# Patient Record
(demographics unavailable — no encounter records)

---

## 2025-01-12 NOTE — END OF VISIT
[FreeTextEntry3] : This note was written by Karen Trevino on 01/09/2025 actively solely Shana Man M.D.  All medical record entries made by this scribe at my, Shana Man M.D direction and personally dictated by me on 01/09/2025. I have personally reviewed the chart and agree that the record reflects my personal performance of the history, physical exam, assessment, and plan.

## 2025-01-12 NOTE — PLAN
[FreeTextEntry1] : - HPV/ PAP done today. - Discussed mammo results, referral given.  - RTO for 2 year annual/ PRN.

## 2025-01-12 NOTE — PHYSICAL EXAM
[Chaperone Present] : A chaperone was present in the examining room during all aspects of the physical examination [Appropriately responsive] : appropriately responsive [Alert] : alert [No Acute Distress] : no acute distress [No Lymphadenopathy] : no lymphadenopathy [Soft] : soft [Non-tender] : non-tender [Non-distended] : non-distended [No HSM] : No HSM [No Lesions] : no lesions [No Mass] : no mass [Oriented x3] : oriented x3 [Examination Of The Breasts] : a normal appearance [No Discharge] : no discharge [No Masses] : no breast masses were palpable [Labia Majora] : normal [Labia Minora] : normal [Normal] : normal [Absent] : absent [Uterine Adnexae] : absent [40213] : A chaperone was present during the pelvic exam. [FreeTextEntry2] : HEMA Mcneil [FreeTextEntry6] : Pt has a hx of super cervical hysterectomy.

## 2025-01-12 NOTE — HISTORY OF PRESENT ILLNESS
[Y] : Patient is sexually active [FreeTextEntry1] : 74 year old female presents for annual GYN exam. Pt reports her BP is always high when she goes to the doctors office. Pt reports she has been well. Pt reports in 2021 her mammo showed micro calcifications and was told it was benign, and inquired about previous mammo.  Pt inquired about MRI and sono of breast. Pt reports she has vaginal estrogen and does not want any more interventions.   Of note, pt has a hx of super cervical hysterectomy. Pt reports she retired 2 years ago. [Monogamous (Male Partner)] : is monogamous with a male partner [Mammogramdate] : 08/2024 [TextBox_19] : f/u with a repeat mammo in 6 months. [PapSmeardate] : 09/2020 [TextBox_37] : showed osteopenia [BoneDensityDate] : 01/2023 [ColonoscopyDate] : 11/2020 [PGHxTotal] : 3 [[facility] : [facility] [[facility]] : 3

## 2025-01-12 NOTE — HISTORY OF PRESENT ILLNESS
[Y] : Patient is sexually active [FreeTextEntry1] : 74 year old female presents for annual GYN exam. Pt reports her BP is always high when she goes to the doctors office. Pt reports she has been well. Pt reports in 2021 her mammo showed micro calcifications and was told it was benign, and inquired about previous mammo.  Pt inquired about MRI and sono of breast. Pt reports she has vaginal estrogen and does not want any more interventions.   Of note, pt has a hx of super cervical hysterectomy. Pt reports she retired 2 years ago. [Monogamous (Male Partner)] : is monogamous with a male partner [Mammogramdate] : 08/2024 [TextBox_19] : f/u with a repeat mammo in 6 months. [PapSmeardate] : 09/2020 [TextBox_37] : showed osteopenia [BoneDensityDate] : 01/2023 [ColonoscopyDate] : 11/2020 [PGHxTotal] : 3 [[facility]] : 3 [[facility] Center[facility]] : 3

## 2025-01-12 NOTE — PHYSICAL EXAM
[Chaperone Present] : A chaperone was present in the examining room during all aspects of the physical examination [Appropriately responsive] : appropriately responsive [Alert] : alert [No Acute Distress] : no acute distress [No Lymphadenopathy] : no lymphadenopathy [Soft] : soft [Non-tender] : non-tender [Non-distended] : non-distended [No HSM] : No HSM [No Lesions] : no lesions [No Mass] : no mass [Oriented x3] : oriented x3 [Examination Of The Breasts] : a normal appearance [No Discharge] : no discharge [No Masses] : no breast masses were palpable [Labia Majora] : normal [Labia Minora] : normal [Normal] : normal [Absent] : absent [Uterine Adnexae] : absent [04270] : A chaperone was present during the pelvic exam. [FreeTextEntry2] : HEMA Mcneil [FreeTextEntry6] : Pt has a hx of super cervical hysterectomy.